# Patient Record
Sex: FEMALE | Race: WHITE | Employment: UNEMPLOYED | URBAN - METROPOLITAN AREA
[De-identification: names, ages, dates, MRNs, and addresses within clinical notes are randomized per-mention and may not be internally consistent; named-entity substitution may affect disease eponyms.]

---
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
## 2024-05-06 ENCOUNTER — HOSPITAL ENCOUNTER (OUTPATIENT)
Age: 19
Setting detail: SPECIMEN
Discharge: HOME | End: 2024-05-06

## 2024-05-06 DIAGNOSIS — R82.90 ABNORMAL URINALYSIS: ICD-10-CM

## 2024-05-06 DIAGNOSIS — R10.2 SUPRAPUBIC DISCOMFORT: ICD-10-CM

## 2024-05-06 DIAGNOSIS — R39.15 URINARY URGENCY: ICD-10-CM

## 2024-05-08 LAB
MICROORGANISM SPEC CULT: ABNORMAL
SPECIMEN DESCRIPTION: ABNORMAL

## 2024-05-10 LAB
CHLAMYDIA DNA UR QL NAA+PROBE: NEGATIVE
N GONORRHOEA DNA UR QL NAA+PROBE: NEGATIVE
SPECIMEN DESCRIPTION: NORMAL

## 2024-07-01 ENCOUNTER — HOSPITAL ENCOUNTER (OUTPATIENT)
Age: 19
Setting detail: SPECIMEN
Discharge: HOME | End: 2024-07-01

## 2024-07-01 PROBLEM — F12.90 MARIJUANA SMOKER: Status: ACTIVE | Noted: 2024-07-01

## 2024-07-01 PROBLEM — Z13.31 POSITIVE DEPRESSION SCREENING: Status: ACTIVE | Noted: 2024-07-01

## 2024-07-15 ENCOUNTER — APPOINTMENT (OUTPATIENT)
Dept: GENERAL RADIOLOGY | Age: 19
End: 2024-07-15
Payer: COMMERCIAL

## 2024-07-15 ENCOUNTER — HOSPITAL ENCOUNTER (EMERGENCY)
Age: 19
Discharge: HOME | End: 2024-07-15
Payer: COMMERCIAL

## 2024-07-15 ENCOUNTER — APPOINTMENT (OUTPATIENT)
Dept: CT IMAGING | Age: 19
End: 2024-07-15
Payer: COMMERCIAL

## 2024-07-15 VITALS
HEART RATE: 61 BPM | DIASTOLIC BLOOD PRESSURE: 64 MMHG | SYSTOLIC BLOOD PRESSURE: 103 MMHG | RESPIRATION RATE: 14 BRPM | TEMPERATURE: 98.6 F | OXYGEN SATURATION: 100 %

## 2024-07-15 DIAGNOSIS — E87.6 HYPOKALEMIA: ICD-10-CM

## 2024-07-15 DIAGNOSIS — A49.9 UTI (URINARY TRACT INFECTION), BACTERIAL: ICD-10-CM

## 2024-07-15 DIAGNOSIS — N39.0 UTI (URINARY TRACT INFECTION), BACTERIAL: ICD-10-CM

## 2024-07-15 DIAGNOSIS — R55 NEAR SYNCOPE: Primary | ICD-10-CM

## 2024-07-15 DIAGNOSIS — E86.0 DEHYDRATION: ICD-10-CM

## 2024-07-15 LAB
ALBUMIN SERPL-MCNC: 4.6 G/DL (ref 3.5–5.2)
ALBUMIN/GLOB SERPL: 1.6 {RATIO} (ref 1–2.5)
ALP SERPL-CCNC: 67 U/L (ref 35–104)
ALT SERPL-CCNC: 11 U/L (ref 5–33)
ANION GAP SERPL CALCULATED.3IONS-SCNC: 11 MMOL/L (ref 9–17)
AST SERPL-CCNC: 12 U/L
BACTERIA URNS QL MICRO: ABNORMAL
BASOPHILS # BLD: 0.03 K/UL (ref 0–0.2)
BASOPHILS NFR BLD: 0 % (ref 0–2)
BILIRUB SERPL-MCNC: 0.3 MG/DL (ref 0.3–1.2)
BILIRUB UR QL STRIP: NEGATIVE
BUN SERPL-MCNC: 10 MG/DL (ref 6–20)
BUN/CREAT SERPL: 14 (ref 9–20)
CALCIUM SERPL-MCNC: 9.2 MG/DL (ref 8.6–10.4)
CHLORIDE SERPL-SCNC: 103 MMOL/L (ref 98–107)
CLARITY UR: CLEAR
CO2 SERPL-SCNC: 26 MMOL/L (ref 20–31)
COLOR UR: YELLOW
CREAT SERPL-MCNC: 0.7 MG/DL (ref 0.5–0.9)
EKG ATRIAL RATE: 55 BPM
EKG P AXIS: 31 DEGREES
EKG P-R INTERVAL: 120 MS
EKG Q-T INTERVAL: 422 MS
EKG QRS DURATION: 86 MS
EKG QTC CALCULATION (BAZETT): 403 MS
EKG R AXIS: 78 DEGREES
EKG T AXIS: 69 DEGREES
EKG VENTRICULAR RATE: 55 BPM
EOSINOPHIL # BLD: 0.09 K/UL (ref 0–0.44)
EOSINOPHILS RELATIVE PERCENT: 1 % (ref 1–4)
EPI CELLS #/AREA URNS HPF: ABNORMAL /HPF (ref 0–25)
ERYTHROCYTE [DISTWIDTH] IN BLOOD BY AUTOMATED COUNT: 12.8 % (ref 11.8–14.4)
GFR, ESTIMATED: >90 ML/MIN/1.73M2
GLUCOSE SERPL-MCNC: 96 MG/DL (ref 70–99)
GLUCOSE UR STRIP-MCNC: NEGATIVE MG/DL
HCG UR QL: NEGATIVE
HCT VFR BLD AUTO: 41.7 % (ref 36.3–47.1)
HGB BLD-MCNC: 13.8 G/DL (ref 11.9–15.1)
HGB UR QL STRIP.AUTO: NEGATIVE
IMM GRANULOCYTES # BLD AUTO: 0.03 K/UL (ref 0–0.3)
IMM GRANULOCYTES NFR BLD: 0 %
KETONES UR STRIP-MCNC: NEGATIVE MG/DL
LEUKOCYTE ESTERASE UR QL STRIP: ABNORMAL
LYMPHOCYTES NFR BLD: 2.34 K/UL (ref 1.2–5.2)
LYMPHOCYTES RELATIVE PERCENT: 22 % (ref 25–45)
MAGNESIUM SERPL-MCNC: 1.9 MG/DL (ref 1.7–2.2)
MCH RBC QN AUTO: 29.3 PG (ref 25.2–33.5)
MCHC RBC AUTO-ENTMCNC: 33.1 G/DL (ref 28.4–34.8)
MCV RBC AUTO: 88.5 FL (ref 82.6–102.9)
MONOCYTES NFR BLD: 0.7 K/UL (ref 0.1–1.4)
MONOCYTES NFR BLD: 7 % (ref 2–8)
NEUTROPHILS NFR BLD: 70 % (ref 34–64)
NEUTS SEG NFR BLD: 7.25 K/UL (ref 1.8–8)
NITRITE UR QL STRIP: NEGATIVE
NRBC BLD-RTO: 0 PER 100 WBC
PH UR STRIP: 6 [PH] (ref 5–9)
PLATELET # BLD AUTO: 235 K/UL (ref 138–453)
PMV BLD AUTO: 11.1 FL (ref 8.1–13.5)
POTASSIUM SERPL-SCNC: 3.4 MMOL/L (ref 3.7–5.3)
PROT SERPL-MCNC: 7.4 G/DL (ref 6.4–8.3)
PROT UR STRIP-MCNC: ABNORMAL MG/DL
RBC # BLD AUTO: 4.71 M/UL (ref 3.95–5.11)
RBC #/AREA URNS HPF: ABNORMAL /HPF (ref 0–2)
SODIUM SERPL-SCNC: 140 MMOL/L (ref 135–144)
SP GR UR STRIP: >1.03 (ref 1.01–1.02)
UROBILINOGEN UR STRIP-ACNC: NORMAL EU/DL (ref 0–1)
WBC #/AREA URNS HPF: ABNORMAL /HPF (ref 0–5)
WBC OTHER # BLD: 10.4 K/UL (ref 4.5–13.5)

## 2024-07-15 PROCEDURE — 96361 HYDRATE IV INFUSION ADD-ON: CPT

## 2024-07-15 PROCEDURE — 87086 URINE CULTURE/COLONY COUNT: CPT

## 2024-07-15 PROCEDURE — 93010 ELECTROCARDIOGRAM REPORT: CPT | Performed by: INTERNAL MEDICINE

## 2024-07-15 PROCEDURE — 83735 ASSAY OF MAGNESIUM: CPT

## 2024-07-15 PROCEDURE — 70450 CT HEAD/BRAIN W/O DYE: CPT

## 2024-07-15 PROCEDURE — 81025 URINE PREGNANCY TEST: CPT

## 2024-07-15 PROCEDURE — 71045 X-RAY EXAM CHEST 1 VIEW: CPT

## 2024-07-15 PROCEDURE — 80053 COMPREHEN METABOLIC PANEL: CPT

## 2024-07-15 PROCEDURE — 87077 CULTURE AEROBIC IDENTIFY: CPT

## 2024-07-15 PROCEDURE — 96365 THER/PROPH/DIAG IV INF INIT: CPT

## 2024-07-15 PROCEDURE — 81001 URINALYSIS AUTO W/SCOPE: CPT

## 2024-07-15 PROCEDURE — 85025 COMPLETE CBC W/AUTO DIFF WBC: CPT

## 2024-07-15 PROCEDURE — 6360000002 HC RX W HCPCS: Performed by: NURSE PRACTITIONER

## 2024-07-15 PROCEDURE — 93005 ELECTROCARDIOGRAM TRACING: CPT | Performed by: NURSE PRACTITIONER

## 2024-07-15 PROCEDURE — 2580000003 HC RX 258: Performed by: NURSE PRACTITIONER

## 2024-07-15 PROCEDURE — 6370000000 HC RX 637 (ALT 250 FOR IP): Performed by: NURSE PRACTITIONER

## 2024-07-15 PROCEDURE — 99285 EMERGENCY DEPT VISIT HI MDM: CPT

## 2024-07-15 RX ORDER — 0.9 % SODIUM CHLORIDE 0.9 %
500 INTRAVENOUS SOLUTION INTRAVENOUS ONCE
Status: COMPLETED | OUTPATIENT
Start: 2024-07-15 | End: 2024-07-15

## 2024-07-15 RX ORDER — POTASSIUM CHLORIDE 20 MEQ/1
40 TABLET, EXTENDED RELEASE ORAL ONCE
Status: COMPLETED | OUTPATIENT
Start: 2024-07-15 | End: 2024-07-15

## 2024-07-15 RX ORDER — CEPHALEXIN 500 MG/1
500 CAPSULE ORAL 3 TIMES DAILY
Qty: 30 CAPSULE | Refills: 0 | Status: SHIPPED | OUTPATIENT
Start: 2024-07-15 | End: 2024-07-25

## 2024-07-15 RX ADMIN — SODIUM CHLORIDE 500 ML: 9 INJECTION, SOLUTION INTRAVENOUS at 10:37

## 2024-07-15 RX ADMIN — CEFTRIAXONE SODIUM 1000 MG: 1 INJECTION, POWDER, FOR SOLUTION INTRAMUSCULAR; INTRAVENOUS at 11:18

## 2024-07-15 RX ADMIN — POTASSIUM CHLORIDE 40 MEQ: 1500 TABLET, EXTENDED RELEASE ORAL at 11:15

## 2024-07-15 RX ADMIN — SODIUM CHLORIDE 500 ML: 9 INJECTION, SOLUTION INTRAVENOUS at 11:30

## 2024-07-15 ASSESSMENT — PAIN - FUNCTIONAL ASSESSMENT: PAIN_FUNCTIONAL_ASSESSMENT: NONE - DENIES PAIN

## 2024-07-15 NOTE — ED PROVIDER NOTES
[facility] ED  EMERGENCY DEPARTMENT ENCOUNTER      Pt Name: Mary Carmen Bolaños  MRN: [id number]  Birthdate 2005  Date of evaluation: 7/15/2024  Provider: RICHARD Weldon CNP  2:33 PM    CHIEF COMPLAINT       Chief Complaint   Patient presents with    Loss of Consciousness     Reports for one month has been having episode of syncope. Today had syncope episode, wants to be seen because episodes are becoming more frequent.         HISTORY OF PRESENT ILLNESS        Mary Carmen Bolaños 17 yo female presents from home to ED with c/o LOC. Reports for one month has been having episode of syncope. Today had syncope episode, wants to be seen because episodes are becoming more frequent. No bedication changes. Stress in starting 2 new jobs but that is recent. No hx seizures, cardiac abnormality.      The history is provided by the patient. No  was used.       Nursing Notes were reviewed.    REVIEW OF SYSTEMS       Review of Systems   Neurological:  Positive for syncope and light-headedness.   All other systems reviewed and are negative.      Except as noted above the remainder of the review of systems was reviewed and negative.       PAST MEDICAL HISTORY   History reviewed. No pertinent past medical history.      SURGICAL HISTORY       Past Surgical History:   Procedure Laterality Date    INSERTION OF CONTRACEPTIVE CAPSULE  11/18/2021    Nexplanon         CURRENT MEDICATIONS       Discharge Medication List as of 7/15/2024  1:01 PM        CONTINUE these medications which have NOT CHANGED    Details   EPINEPHrine (EPIPEN) 0.3 MG/0.3ML SOAJ injection INJECT UNDER SKIN AS DIRECTED PER FACIAL SWELLING EPISODEHistorical Med      triamcinolone (KENALOG) 0.1 % cream APPLY TO BODY SURFACE AREAS TWICE DAILY X 2 WEEKS OFF X 1 WEEK AND REPEAT AS NEEDED (NECK DOWN), Historical Med             ALLERGIES     Tomato, Banana, Chocolate flavor, and Morton    FAMILY HISTORY       Family History   Problem

## 2024-07-15 NOTE — DISCHARGE INSTRUCTIONS
Increase water  Increase potassium rich foods  Cephalexin 500 mg 1 by mouth every 8 hours x 10 days beginning at bedtime tonight.  If rash develops discontinue this medication take over-the-counter loratadine and let us or Adrianna PENA know regarding reaction  Await urine culture results.  I will call you on Wednesday if bacteria in the urine is resistant to cephalexin.  At that time I will have sent a new prescription to your pharmacy for you.    You received 1 L of 0.9 normal saline, Rocephin 1 g-an antibiotic, and potassium replacement of 40 meq po here

## 2024-07-16 LAB
MICROORGANISM SPEC CULT: NORMAL
SPECIMEN DESCRIPTION: NORMAL

## 2024-08-09 ENCOUNTER — HOSPITAL ENCOUNTER (OUTPATIENT)
Age: 19
Setting detail: SPECIMEN
Discharge: HOME | End: 2024-08-09

## 2024-08-09 ENCOUNTER — PROCEDURE VISIT (OUTPATIENT)
Dept: OBGYN CLINIC | Age: 19
End: 2024-08-09

## 2024-08-09 VITALS
WEIGHT: 123.6 LBS | SYSTOLIC BLOOD PRESSURE: 117 MMHG | DIASTOLIC BLOOD PRESSURE: 70 MMHG | HEART RATE: 67 BPM | HEIGHT: 64 IN | BODY MASS INDEX: 21.1 KG/M2

## 2024-08-09 DIAGNOSIS — Z30.430 ENCOUNTER FOR IUD INSERTION: ICD-10-CM

## 2024-08-09 DIAGNOSIS — N89.8 VAGINAL DISCHARGE: ICD-10-CM

## 2024-08-09 DIAGNOSIS — N89.8 VAGINAL DISCHARGE: Primary | ICD-10-CM

## 2024-08-09 DIAGNOSIS — Z30.46 NEXPLANON REMOVAL: ICD-10-CM

## 2024-08-09 LAB
C TRACH DNA SPEC QL PROBE+SIG AMP: NORMAL
CANDIDA SPECIES: NEGATIVE
GARDNERELLA VAGINALIS: NEGATIVE
N GONORRHOEA DNA SPEC QL PROBE+SIG AMP: NORMAL
SOURCE: NORMAL
SPECIMEN DESCRIPTION: NORMAL
TRICHOMONAS: NEGATIVE

## 2024-08-09 NOTE — PROGRESS NOTES
[facility]  [address]  [address]  [place] [address]  Dept: [phone]    Procedure Note: NEXPLANON removal and IUD insertion  (See separate note regarding nexplanon removal)  Subjective:   Mary Carmen Bolaños 18 y.o. female  is here for previously scheduled kyleena IUD insertion. They have not had unprotected intercourse since their last menstrual period, Patient's last menstrual period was 2024.    I evaluated their contraindications to iud placement: There is no current pregnancy, they are not within 6 weeks postpartum, there is no progestin allergy and no mucopurulent cervicitis.    She has no complaints today. She is known to have painful menses that interfere with her ADL's. She has tried NSAIDS in the past without success. She wishes to continue to utilize barrier contraception for family planning and STD precautions.  The side affect profile of the IUD was reviewed. All other forms of family planning and options for treatment of her dysmennorhea were reviewed with the patient.         Vitals:   Last menstrual period 2024, not currently breastfeeding.  Patient Active Problem List   Diagnosis    Allergic rhinitis    Right otitis media    Anxiety and depression    Marijuana smoker    Positive depression screening      Lab:  Pregnancy Test:  Lab Results   Component Value Date    PREGTESTUR NEGATIVE 07/15/2024     Procedure: Insertion of kyleena IUD    Indications: dysmenorrhea/heavy menstrual bleeding    Procedure Details:   The patient was counseled on the procedure. Risks, benefits and alternatives were reviewed. I have answered all of the patient's questions about possible infection, complications and fertility after and during use. The risks we discussed included bleeding andinfection post procedure, risk for undetected expulsion, uterine perforation and migration into the abdomen and the very small risk

## 2024-08-09 NOTE — PROGRESS NOTES
[facility]  [address]  [address]  [place] [address]      Procedure Note: Nexplanon removal and iud insertion  (See separate note regarding iud insertion)    Mary Carmen Bolaños  2024                       Primary Care Physician: Adrianna Rodriges, APRN - CNP      Subjective: Mary Carmen Bolaños 18 y.o. female  is here for previously scheduled Nexplanon removal due to history of dysmenorrhea.  Patient's last menstrual period was 2024.. She has no complaints today.     Nexplanon was inserted 2021    Vitals:   There were no vitals filed for this visit.    Procedure: Nexplanon Removal    Indications: patient request    Procedure Details:   The patient was counseled on the procedure. Risks, benefits and alternatives were reviewed. The patient is aware that this is diagnostic and not curative and a second procedure may be needed. A consent was reviewed and obtained.    PROCEDURE:  Pt was placed on the exam table with her left arm extended and supinated. The insertion site from the nexplanon was noted and the device was palpated. 3 mL 1% lidocaine injected. After analgesia was confirmed, a scalpel was used to make an incision through her old scar. With assistance from Dr. Sanchez, The device was brought easily through the incision and noted to be intact. It was shown to the patient. The incision was dressed with steris and a pressure dressing.     Lab:  Pregnancy Test:  Lab Results   Component Value Date    PREGTESTUR NEGATIVE 07/15/2024     Assessment & Plan:  Mary Carmen Bolaños 18 y.o. female   Patient Active Problem List    Diagnosis Date Noted    Marijuana smoker 2024    Positive depression screening 2024    Anxiety and depression 2019     Has seen Psych for management of medications and counseling services      Right otitis media 2014    Allergic rhinitis 2014     The patient was counseled on follow up and home care with

## 2024-08-12 LAB
C TRACH DNA SPEC QL PROBE+SIG AMP: NEGATIVE
N GONORRHOEA DNA SPEC QL PROBE+SIG AMP: NEGATIVE
SPECIMEN DESCRIPTION: NORMAL

## 2024-11-14 ENCOUNTER — PROCEDURE VISIT (OUTPATIENT)
Dept: OBGYN CLINIC | Age: 19
End: 2024-11-14
Payer: COMMERCIAL

## 2024-11-14 VITALS
HEART RATE: 59 BPM | SYSTOLIC BLOOD PRESSURE: 116 MMHG | HEIGHT: 64 IN | BODY MASS INDEX: 19.53 KG/M2 | WEIGHT: 114.4 LBS | DIASTOLIC BLOOD PRESSURE: 77 MMHG

## 2024-11-14 DIAGNOSIS — Z30.431 IUD CHECK UP: Primary | ICD-10-CM

## 2024-11-14 PROCEDURE — 99212 OFFICE O/P EST SF 10 MIN: CPT

## 2024-11-14 NOTE — PROGRESS NOTES
[facility]  [place]  [address]  [address]  [place] [address]  Dept: [phone]  24     Chief Complaint   Patient presents with    Procedure     String check      Mary Carmen Bolaños is a  female whopresents to the office for an IUD check.  The kyleena IUD was placed on 24.  She had vaginal bleeding since the IUD was placed but this has now stopped.  She denies cramping since it was placed.  She is happy with the IUD.  She has had intercourse since it was placed, and denies concerns.    Review of Systems   Genitourinary:  Positive for vaginal discharge. Negative for decreased urine volume, difficulty urinating, dyspareunia, dysuria, frequency, hematuria, menstrual problem, urgency and vaginal pain.   All other systems reviewed and are negative.      No past medical history on file.    Past Surgical History:   Procedure Laterality Date    INSERTION OF CONTRACEPTIVE CAPSULE  2021    Nexplanon       Current Outpatient Medications on File Prior to Visit   Medication Sig Dispense Refill    EPINEPHrine (EPIPEN) 0.3 MG/0.3ML SOAJ injection INJECT UNDER SKIN AS DIRECTED PER FACIAL SWELLING EPISODE      triamcinolone (KENALOG) 0.1 % cream APPLY TO BODY SURFACE AREAS TWICE DAILY X 2 WEEKS OFF X 1 WEEK AND REPEAT AS NEEDED (NECK DOWN) (Patient not taking: Reported on 7/15/2024)       Current Facility-Administered Medications on File Prior to Visit   Medication Dose Route Frequency Provider Last Rate Last Admin    Levonorgestrel (KYLEENA) IUD 19.5 mg 1 each  1 each IntraUTERine Once Monique Alfaro APRN - CNP        etonogestrel (NEXPLANON) implant 68 mg  68 mg Subdermal Continuous Monique Gupta APRN - CNP   68 mg at 21 1008       Social History     Socioeconomic History    Marital status: Single     Spouse name: Not on file    Number of children: Not on file    Years of education: Not on file    Highest education

## 2025-05-23 ENCOUNTER — HOSPITAL ENCOUNTER (OUTPATIENT)
Age: 20
Discharge: HOME | End: 2025-05-23
Payer: COMMERCIAL

## 2025-05-23 LAB
ALT SERPL-CCNC: 9 U/L (ref 10–35)
BASOPHILS # BLD: <0.03 K/UL (ref 0–0.2)
BASOPHILS NFR BLD: 0 % (ref 0–2)
BUN SERPL-MCNC: 9 MG/DL (ref 6–20)
CREAT SERPL-MCNC: 0.7 MG/DL (ref 0.5–0.9)
EOSINOPHIL # BLD: 0.03 K/UL (ref 0–0.44)
EOSINOPHILS RELATIVE PERCENT: 0 % (ref 1–4)
ERYTHROCYTE [DISTWIDTH] IN BLOOD BY AUTOMATED COUNT: 12.3 % (ref 11.8–14.4)
ERYTHROCYTE [SEDIMENTATION RATE] IN BLOOD BY PHOTOMETRIC METHOD: 5 MM/HR (ref 0–20)
GFR, ESTIMATED: >90 ML/MIN/1.73M2
HCT VFR BLD AUTO: 42.9 % (ref 36.3–47.1)
HGB BLD-MCNC: 14.4 G/DL (ref 11.9–15.1)
IGA SERPL-MCNC: 360 MG/DL (ref 70–400)
IGE SERPL-ACNC: 288 IU/ML (ref 0–100)
IGG SERPL-MCNC: 1051 MG/DL (ref 700–1600)
IGM SERPL-MCNC: 173 MG/DL (ref 40–230)
IMM GRANULOCYTES # BLD AUTO: <0.03 K/UL (ref 0–0.3)
IMM GRANULOCYTES NFR BLD: 0 %
LYMPHOCYTES NFR BLD: 1.79 K/UL (ref 1.2–5.2)
LYMPHOCYTES RELATIVE PERCENT: 20 % (ref 25–45)
MCH RBC QN AUTO: 29.3 PG (ref 25.2–33.5)
MCHC RBC AUTO-ENTMCNC: 33.6 G/DL (ref 28.4–34.8)
MCV RBC AUTO: 87.2 FL (ref 82.6–102.9)
MONOCYTES NFR BLD: 0.72 K/UL (ref 0.1–1.4)
MONOCYTES NFR BLD: 8 % (ref 2–8)
NEUTROPHILS NFR BLD: 72 % (ref 34–64)
NEUTS SEG NFR BLD: 6.25 K/UL (ref 1.8–8)
NRBC BLD-RTO: 0 PER 100 WBC
PLATELET # BLD AUTO: 244 K/UL (ref 138–453)
PMV BLD AUTO: 10.9 FL (ref 8.1–13.5)
RBC # BLD AUTO: 4.92 M/UL (ref 3.95–5.11)
RHEUMATOID FACT SER NEPH-ACNC: <10 IU/ML (ref 0–13)
WBC OTHER # BLD: 8.8 K/UL (ref 4.5–13.5)

## 2025-05-23 PROCEDURE — 86038 ANTINUCLEAR ANTIBODIES: CPT

## 2025-05-23 PROCEDURE — 86008 ALLG SPEC IGE RECOMB EA: CPT

## 2025-05-23 PROCEDURE — 85025 COMPLETE CBC W/AUTO DIFF WBC: CPT

## 2025-05-23 PROCEDURE — 82565 ASSAY OF CREATININE: CPT

## 2025-05-23 PROCEDURE — 86738 MYCOPLASMA ANTIBODY: CPT

## 2025-05-23 PROCEDURE — 86225 DNA ANTIBODY NATIVE: CPT

## 2025-05-23 PROCEDURE — 86431 RHEUMATOID FACTOR QUANT: CPT

## 2025-05-23 PROCEDURE — 82784 ASSAY IGA/IGD/IGG/IGM EACH: CPT

## 2025-05-23 PROCEDURE — 83520 IMMUNOASSAY QUANT NOS NONAB: CPT

## 2025-05-23 PROCEDURE — 82785 ASSAY OF IGE: CPT

## 2025-05-23 PROCEDURE — 84520 ASSAY OF UREA NITROGEN: CPT

## 2025-05-23 PROCEDURE — 86003 ALLG SPEC IGE CRUDE XTRC EA: CPT

## 2025-05-23 PROCEDURE — 36415 COLL VENOUS BLD VENIPUNCTURE: CPT

## 2025-05-23 PROCEDURE — 84460 ALANINE AMINO (ALT) (SGPT): CPT

## 2025-05-23 PROCEDURE — 85652 RBC SED RATE AUTOMATED: CPT

## 2025-05-26 LAB
M PNEUMO IGM SER QL IA: 1.26
TRYPTASE SERPL-MCNC: 3.4 UG/L

## 2025-05-27 LAB
ANA SER QL IA: NEGATIVE
CASEIN IGE QN: <0.1 KU/L (ref 0–0.34)
CHOCOLATE IGE QN: <0.1 KU/L (ref 0–0.34)
COW MILK IGE QN: <0.1 KU/L (ref 0–0.34)
DSDNA IGG SER QL IA: 0.9 IU/ML
EGG WHITE IGE QN: <0.1 KU/L (ref 0–0.34)
EGG YOLK IGE QN: <0.1 KU/L (ref 0–0.34)
LATEX IGE: <0.1 KU/L (ref 0–0.34)
NUCLEAR IGG SER IA-RTO: <0.1 U/ML
SOYBEAN IGE QN: <0.1 KU/L (ref 0–0.34)
STRAWBERRY IGE QN: <0.1 KU/L (ref 0–0.34)
TOMATO IGE QN: <0.1 KU/L (ref 0–0.34)
WHEAT IGE QN: <0.1 KU/L (ref 0–0.34)